# Patient Record
Sex: MALE | Race: WHITE
[De-identification: names, ages, dates, MRNs, and addresses within clinical notes are randomized per-mention and may not be internally consistent; named-entity substitution may affect disease eponyms.]

---

## 2023-08-22 ENCOUNTER — HOSPITAL ENCOUNTER (EMERGENCY)
Dept: HOSPITAL 75 - ER | Age: 33
Discharge: HOME | End: 2023-08-22
Payer: COMMERCIAL

## 2023-08-22 VITALS — WEIGHT: 240.3 LBS | HEIGHT: 74.8 IN | BODY MASS INDEX: 30.19 KG/M2

## 2023-08-22 VITALS — SYSTOLIC BLOOD PRESSURE: 152 MMHG | DIASTOLIC BLOOD PRESSURE: 80 MMHG

## 2023-08-22 DIAGNOSIS — M54.50: ICD-10-CM

## 2023-08-22 DIAGNOSIS — R07.2: Primary | ICD-10-CM

## 2023-08-22 LAB
ALBUMIN SERPL-MCNC: 4.8 GM/DL (ref 3.2–4.5)
ALP SERPL-CCNC: 50 U/L (ref 40–136)
ALT SERPL-CCNC: 32 U/L (ref 0–55)
APTT BLD: 29 SEC (ref 24–35)
APTT PPP: YELLOW S
BACTERIA #/AREA URNS HPF: NEGATIVE /HPF
BASOPHILS # BLD AUTO: 0.1 10^3/UL (ref 0–0.1)
BASOPHILS NFR BLD AUTO: 1 % (ref 0–10)
BILIRUB SERPL-MCNC: 0.3 MG/DL (ref 0.1–1)
BILIRUB UR QL STRIP: NEGATIVE
BUN/CREAT SERPL: 8
CALCIUM SERPL-MCNC: 9.2 MG/DL (ref 8.5–10.1)
CHLORIDE SERPL-SCNC: 109 MMOL/L (ref 98–107)
CO2 SERPL-SCNC: 25 MMOL/L (ref 21–32)
CREAT SERPL-MCNC: 0.93 MG/DL (ref 0.6–1.3)
EOSINOPHIL # BLD AUTO: 0.2 10^3/UL (ref 0–0.3)
EOSINOPHIL NFR BLD AUTO: 2 % (ref 0–10)
FIBRINOGEN PPP-MCNC: CLEAR MG/DL
GFR SERPLBLD BASED ON 1.73 SQ M-ARVRAT: 112 ML/MIN
GLUCOSE SERPL-MCNC: 95 MG/DL (ref 70–105)
GLUCOSE UR STRIP-MCNC: NEGATIVE MG/DL
HCT VFR BLD CALC: 40 % (ref 40–54)
HGB BLD-MCNC: 13.6 G/DL (ref 13.3–17.7)
INR PPP: 1 (ref 0.8–1.4)
KETONES UR QL STRIP: NEGATIVE
LEUKOCYTE ESTERASE UR QL STRIP: NEGATIVE
LYMPHOCYTES # BLD AUTO: 2.7 10^3/UL (ref 1–4)
LYMPHOCYTES NFR BLD AUTO: 31 % (ref 12–44)
MAGNESIUM SERPL-MCNC: 2 MG/DL (ref 1.6–2.4)
MANUAL DIFFERENTIAL PERFORMED BLD QL: NO
MCH RBC QN AUTO: 29 PG (ref 25–34)
MCHC RBC AUTO-ENTMCNC: 34 G/DL (ref 32–36)
MCV RBC AUTO: 84 FL (ref 80–99)
MONOCYTES # BLD AUTO: 0.8 10^3/UL (ref 0–1)
MONOCYTES NFR BLD AUTO: 9 % (ref 0–12)
NEUTROPHILS # BLD AUTO: 4.9 10^3/UL (ref 1.8–7.8)
NEUTROPHILS NFR BLD AUTO: 57 % (ref 42–75)
NITRITE UR QL STRIP: NEGATIVE
PH UR STRIP: 7 [PH] (ref 5–9)
PLATELET # BLD: 247 10^3/UL (ref 130–400)
PMV BLD AUTO: 11.4 FL (ref 9–12.2)
POTASSIUM SERPL-SCNC: 3.7 MMOL/L (ref 3.6–5)
PROT SERPL-MCNC: 6.9 GM/DL (ref 6.4–8.2)
PROT UR QL STRIP: NEGATIVE
PROTHROMBIN TIME: 12.9 SEC (ref 12.2–14.7)
RBC #/AREA URNS HPF: (no result) /HPF
SODIUM SERPL-SCNC: 144 MMOL/L (ref 135–145)
SP GR UR STRIP: <=1.005 (ref 1.02–1.02)
SQUAMOUS #/AREA URNS HPF: (no result) /HPF
WBC # BLD AUTO: 8.6 10^3/UL (ref 4.3–11)
WBC #/AREA URNS HPF: (no result) /HPF

## 2023-08-22 PROCEDURE — 36415 COLL VENOUS BLD VENIPUNCTURE: CPT

## 2023-08-22 PROCEDURE — 82550 ASSAY OF CK (CPK): CPT

## 2023-08-22 PROCEDURE — 84484 ASSAY OF TROPONIN QUANT: CPT

## 2023-08-22 PROCEDURE — 93005 ELECTROCARDIOGRAM TRACING: CPT

## 2023-08-22 PROCEDURE — 85730 THROMBOPLASTIN TIME PARTIAL: CPT

## 2023-08-22 PROCEDURE — 85025 COMPLETE CBC W/AUTO DIFF WBC: CPT

## 2023-08-22 PROCEDURE — 81000 URINALYSIS NONAUTO W/SCOPE: CPT

## 2023-08-22 PROCEDURE — 83735 ASSAY OF MAGNESIUM: CPT

## 2023-08-22 PROCEDURE — 80053 COMPREHEN METABOLIC PANEL: CPT

## 2023-08-22 PROCEDURE — 85610 PROTHROMBIN TIME: CPT

## 2023-08-22 PROCEDURE — 71045 X-RAY EXAM CHEST 1 VIEW: CPT

## 2023-08-22 PROCEDURE — 93041 RHYTHM ECG TRACING: CPT

## 2023-08-22 NOTE — DIAGNOSTIC IMAGING REPORT
CHEST 1 VIEW, AP/PA ONLY 



INDICATION: Chest pain.



COMPARISON: None.



FINDINGS:



Lungs: Normal lung volume. No focal consolidation. Stable

pulmonary vasculature. 



Pleura: No pleural effusion or pneumothorax.



Heart and Mediastinum: Cardiomediastinal silhouette and great

vessels of the thorax are stable.



Osseous Structures and Soft Tissues: No acute osseous

abnormality. Normal soft tissues.



IMPRESSION:

No acute cardiopulmonary process.



Dictated by: 



  Dictated on workstation # [id number]

## 2023-08-22 NOTE — ED CHEST PAIN
General


Chief Complaint:  Exposure


Stated Complaint:  CHEST PAIN


Nursing Triage Note:  


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 


MAINTENACE WORK, AND BACK PAIN


Source:  patient


Exam Limitations:  no limitations





History of Present Illness


Date Seen by Provider:  Aug 22, 2023


Time Seen by Provider:  18:30


Initial Comments


Patient is a 33-year-old male who presents to the emergency room with a chief 

complaint of substernal chest pain as well as low back pain.  Onset of the 

symptoms within the last 2 hours after getting home from work.  Patient was out 

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  He states he drinks 

quite a bit of water.  He states that he took some Tylenol at home for the pain 

without any relief of symptoms.  The patient gradually became more intense after

work to the point that he describes it as a "10 out of 10" pain.  He was a 

little nauseous and short of breath with the chest pain.  He has never had pain 

like this before.  He states he has urinated normally today.  Normal bowel 

movements.  He does have an extensive family history of early coronary artery 

disease.  The patient is a non-smoker, nondrinker.  He does not take medications

for blood pressure.  No illicit substances.  Nothing makes the chest pain any 

worse or better.  Nothing makes the back pain any worse or better.  He states 

the back pain is located low across the top of his hips.  It does not radiate 

into his groin or down his legs.  He denies leg weakness, numbness.  No loss of 

bowel or bladder function.  He did have a trip and near fall yesterday which 

resulted in an ankle sprain on the left.





Patient denies any recent prolonged immobility or extensive travel.  He has had 

no recent surgeries.  He has never had a blood clot before.


Timing/Duration:  1-3 hours


Severity/Quality:  severe


Location:  central (left central)


Radiation:  no radiation


Activities at Onset:  other (just after work)


Prior CP/Workup:  no prior chest pain, no prior cardiac workup


ASA po PTA:  No


NTG SL PTA:  No


Associated Symptoms:  back pain, nausea/vomiting, shortness of breath





Allergies and Home Medications


Allergies


Coded Allergies:  


     No Known Drug Allergies (Unverified , 23)





Patient Home Medication List


Home Medication List Reviewed:  Yes


Methocarbamol (Methocarbamol) 750 Mg Tablet, 750 MG PO Q8H


   Prescribed by: JERICA MUNIZ on 23





Review of Systems


Review of Systems


Constitutional:  see HPI


EENTM:  No Symptoms Reported


Respiratory:  Shortness of Air


Cardiovascular:  Chest Pain


Gastrointestinal:  Nausea


Genitourinary:  No Symptoms Reported


Musculoskeletal:  back pain


Skin:  no symptoms reported





Past Medical-Social-Family Hx


Patient Social History


Tobacco Use?:  No


Use of E-Cig and/or Vaping dev:  No


Substance use?:  No


Alcohol Use?:  No


Pt feels they are or have been:  No





Past Medical History


Surgery/Hospitalization HX:  


R WRIST SURGERY





Physical Exam


Vital Signs





Vital Signs - First Documented








 23





 18:30


 


Temp 37.1


 


Pulse 94


 


Resp 15


 


B/P (MAP) 165/111 (129)


 


Pulse Ox 98


 


O2 Delivery Room Air





Capillary Refill :


Height, Weight, BMI


Height: '"


Weight: lbs. oz. kg; 30.00 BMI


Method:


General Appearance:  WD/WN, Anxious


HEENT:  PERRL/EOMI


Neck:  Normal Inspection, Supple


Respiratory:  Lungs Clear, Normal Breath Sounds, No Accessory Muscle Use, No 

Respiratory Distress


Cardiovascular:  Regular Rate, Rhythm, Normal Peripheral Pulses (2+ rad bilat)


Gastrointestinal:  Soft


Extremity:  Normal Capillary Refill, Normal Inspection, Normal Range of Motion, 

Non Tender, No Calf Tenderness, No Pedal Edema


Neurologic/Psychiatric:  Alert, Oriented x3, No Motor/Sensory Deficits, CNs II-

XII Norm as Tested


Skin:  Normal Color, Warm/Dry





Progress/Results/Core Measures


Results/Orders


Lab Results





Laboratory Tests








Test


 23


18:32 23


19:05 23


20:51 Range/Units


 


 


White Blood Count


 8.6 


 


 


 4.3-11.0


10^3/uL


 


Red Blood Count


 4.73 


 


 


 4.30-5.52


10^6/uL


 


Hemoglobin 13.6    13.3-17.7  g/dL


 


Hematocrit 40    40-54  %


 


Mean Corpuscular Volume 84    80-99  fL


 


Mean Corpuscular Hemoglobin 29    25-34  pg


 


Mean Corpuscular Hemoglobin


Concent 34 


 


 


 32-36  g/dL





 


Red Cell Distribution Width 12.9    10.0-14.5  %


 


Platelet Count


 247 


 


 


 130-400


10^3/uL


 


Mean Platelet Volume 11.4    9.0-12.2  fL


 


Immature Granulocyte % (Auto) 0     %


 


Neutrophils (%) (Auto) 57    42-75  %


 


Lymphocytes (%) (Auto) 31    12-44  %


 


Monocytes (%) (Auto) 9    0-12  %


 


Eosinophils (%) (Auto) 2    0-10  %


 


Basophils (%) (Auto) 1    0-10  %


 


Neutrophils # (Auto)


 4.9 


 


 


 1.8-7.8


10^3/uL


 


Lymphocytes # (Auto)


 2.7 


 


 


 1.0-4.0


10^3/uL


 


Monocytes # (Auto)


 0.8 


 


 


 0.0-1.0


10^3/uL


 


Eosinophils # (Auto)


 0.2 


 


 


 0.0-0.3


10^3/uL


 


Basophils # (Auto)


 0.1 


 


 


 0.0-0.1


10^3/uL


 


Immature Granulocyte # (Auto)


 0.0 


 


 


 0.0-0.1


10^3/uL


 


Prothrombin Time 12.9    12.2-14.7  SEC


 


INR Comment 1.0    0.8-1.4  


 


Activated Partial


Thromboplast Time 29 


 


 


 24-35  SEC





 


Sodium Level 144    135-145  MMOL/L


 


Potassium Level 3.7    3.6-5.0  MMOL/L


 


Chloride Level 109 H     MMOL/L


 


Carbon Dioxide Level 25    21-32  MMOL/L


 


Anion Gap 10    5-14  MMOL/L


 


Blood Urea Nitrogen 7    7-18  MG/DL


 


Creatinine


 0.93 


 


 


 0.60-1.30


MG/DL


 


Estimat Glomerular Filtration


Rate 112 


 


 


  





 


BUN/Creatinine Ratio 8     


 


Glucose Level 95      MG/DL


 


Calcium Level 9.2    8.5-10.1  MG/DL


 


Corrected Calcium     8.5-10.1  MG/DL


 


Magnesium Level 2.0    1.6-2.4  MG/DL


 


Total Bilirubin 0.3    0.1-1.0  MG/DL


 


Aspartate Amino Transf


(AST/SGOT) 24 


 


 


 5-34  U/L





 


Alanine Aminotransferase


(ALT/SGPT) 32 


 


 


 0-55  U/L





 


Alkaline Phosphatase 50      U/L


 


Total Creatine Kinase 282 H     U/L


 


Troponin I < 0.028   < 0.028  <0.028  NG/ML


 


Total Protein 6.9    6.4-8.2  GM/DL


 


Albumin 4.8 H   3.2-4.5  GM/DL


 


Urine Color  YELLOW    


 


Urine Clarity  CLEAR    


 


Urine pH  7.0   5-9  


 


Urine Specific Gravity  <=1.005   1.016-1.022  


 


Urine Protein  NEGATIVE   NEGATIVE  


 


Urine Glucose (UA)  NEGATIVE   NEGATIVE  


 


Urine Ketones  NEGATIVE   NEGATIVE  


 


Urine Nitrite  NEGATIVE   NEGATIVE  


 


Urine Bilirubin  NEGATIVE   NEGATIVE  


 


Urine Urobilinogen  0.2   < = 1.0  MG/DL


 


Urine Leukocyte Esterase  NEGATIVE   NEGATIVE  


 


Urine RBC (Auto)  NEGATIVE   NEGATIVE  


 


Urine RBC  NONE    /HPF


 


Urine WBC  NONE    /HPF


 


Urine Squamous Epithelial


Cells 


 NONE 


 


  /HPF





 


Urine Crystals  NONE    /LPF


 


Urine Bacteria  NEGATIVE    /HPF


 


Urine Casts  NONE    /LPF


 


Urine Mucus  NEGATIVE    /LPF


 


Urine Culture Indicated  NO    








My Orders





Orders - JERICA MUNIZ MD


Ekg Tracing (23 18:29)


Cbc With Automated Diff (23 18:38)


Magnesium (23 18:38)


Chest 1 View, Ap/Pa Only (23 18:38)


Comprehensive Metabolic Panel (23 18:38)


Protime With Inr (23 18:38)


Partial Thromboplastin Time (23 18:38)


O2 (23 18:38)


Monitor-Rhythm Ecg Trace Only (23 18:38)


Ed Iv/Invasive Line Start (23 18:38)


Troponin I [place] (23 18:38)


Aspirin Chewable Tablet (Aspirin Chewabl (23 18:45)


Morphine  Injection (Morphine  Injection (23 18:38)


Ns Iv 1000 Ml (Sodium Chloride 0.9%) (23 18:38)


Ondansetron Injection (Zofran Injectio (23 18:45)


Creatine Kinase (23 19:11)


Ua Culture If Indicated (23 19:11)


Ekg Tracing (23 19:19)


Nitroglycerin 0.4 Mg Btl 25's (Nitroglyc (23 19:30)


Ketorolac Injection (Ketorolac Injection (23 19:45)


Orphenadrine Inj (Ed Only) (Norflex Inje (23 19:45)


Troponin I [place] (23 20:29)


Ketorolac Injection (Ketorolac Injection (23 21:00)





Medications Given in ED





Current Medications








 Medications  Dose


 Ordered  Sig/Carla


 Route  Start Time


 Stop Time Status Last Admin


Dose Admin


 


 Aspirin  324 mg  ONCE  ONCE


 PO  23 18:45


 23 18:46 DC 23 18:47


324 MG


 


 Ketorolac


 Tromethamine  15 mg  ONCE  ONCE


 IVP  23 19:45


 23 19:46 DC 23 19:41


15 MG


 


 Ketorolac


 Tromethamine  15 mg  ONCE  ONCE


 IVP  23 21:00


 23 21:01 DC 23 21:03


15 MG


 


 Nitroglycerin  1 TAB Q 5


 MIN X 3  AS NEEDED  PRN


 SL  23 19:30


 23 22:02 DC 23 19:26


0.4 MG


 


 Ondansetron HCl  4 mg  ONCE  ONCE


 IVP  23 18:45


 23 18:46 DC 23 18:47


4 MG


 


 Orphenadrine


 Citrate  60 mg  ONCE  ONCE


 IV  23 19:45


 23 19:46 DC 23 19:40


60 MG








Vital Signs/I&O











 23





 18:30 22:00


 


Temp 37.1 37.1


 


Pulse 94 85


 


Resp 15 15


 


B/P (MAP) 165/111 (129) 152/80


 


Pulse Ox 98 98


 


O2 Delivery Room Air Room Air














Blood Pressure Mean:                    129











Progress


Progress Note :  


   Time:  21:40


Progress Note


Patient seen and evaluated by me.  Evaluation today includes physical exam, 

"chest pain work-up" to include CBC, Chem-12, magnesium level, troponin x2, coag

panel, UA, single view chest x-ray, EKG x2.  Pertinent physical exam findings 

well-developed well-nourished male in mild distress due to pain.  He is quite 

hypertensive on arrival.  Heart is regular, lungs are clear.  Abdomen is soft.  

No reproducible chest wall tenderness.  Patient has some tenderness to palpation

in the low lumbar paraspinous musculature.  Negative straight leg raise 

bilaterally.  Normal distal pulses and sensation.  Normal strength in the lower 

extremities, no saddle anesthesia.  2+ radial pulses, 2+ dorsalis pedis pulses.





Differential diagnosis based on history and physical exam, dehydration, aortic 

dissection, acute coronary syndrome





Labs, EKG and chest x-ray independently reviewed and interpreted by me.  Total 

EKG is normal sinus rhythm without ectopy or ST segment change, repeat 1 hour 

later due to ongoing pain remains unchanged.  Chest x-ray shows no acute 

abnormality, no widened mediastinum, no effusion, no infiltrate.  CBC is 

reviewed and normal, Chem-12 is reviewed and normal.  Magnesium within normal 

limits.  Troponin x2 undetectable.  Urinalysis shows no evidence of hematuria or

infection.


Patient was initially given 324 mg of aspirin followed by morphine 4 mg and 

Zofran 4 mg.  When he did not achieve any significant relief I switched to 

sublingual nitro which made no improvement in his pain whatsoever.  Patient was 

then given 60 mg of IV Norflex and a total of 30 mg of IV Toradol.








Patient reevaluated after second troponin and second dose of Toradol feeling mu

ch better.  Able to sit up and lay down much more comfortably.  Suspect that the

majority of his symptoms are due to working out in the heat and some 

dehydration.  He has no concerning findings for acute coronary syndrome or 

aortic dissection.  No concerning complaints or supporting physical exam 

findings for pulmonary embolism.  No concerning findings for acute cauda equina 

syndrome.  Continues to deny numbness tingling weakness to the groin or legs.  

No radiating pain down the legs.  I reviewed discharge recommendation with the 

patient and his wife.  Over-the-counter naproxen 2 pills twice daily with food 

for the next 2 to 3 days.  Alternate heat and ice to the low back at the area of

his pain.  If any numbness tingling weakness or increasing pain recurs to please

return to the emergency room for reevaluation.


Initial ECG Impression Date:  Aug 22, 2023


Initial ECG Impression Time:  18:34


Initial ECG Rate:  98


Initial ECG Rhythm:  S.Tach


Initial ECG Intervals:  Normal


Initial ECG Impression:  Normal


EKG :  


   EKG Time:  19:22


   Rate:  75


   Rhythm:  Normal Sinus


   Intervals:  Normal


   ECG Comparisson:  Unchanged


   ECG Impression:  Normal





Diagnostic Imaging





   Diagonstic Imaging:  Xray


   Plain Films/CT/US/NM/MRI:  chest


Comments


                 [facility] [place], [place]





NAME:   ASHLEY CROCKETT


[facility] REC#:   [id number]


ACCOUNT#:   [id number]


PT STATUS:   REG ER


:   1990


PHYSICIAN:   JERICA MUNIZ MD


ADMIT DATE:   23/ER


                                  ***Signed***


Date of Exam:23





CHEST 1 VIEW, AP/PA ONLY








CHEST 1 VIEW, AP/PA ONLY 





INDICATION: Chest pain.





COMPARISON: None.





FINDINGS:





Lungs: Normal lung volume. No focal consolidation. Stable


pulmonary vasculature. 





Pleura: No pleural effusion or pneumothorax.





Heart and Mediastinum: Cardiomediastinal silhouette and great


vessels of the thorax are stable.





Osseous Structures and Soft Tissues: No acute osseous


abnormality. Normal soft tissues.





IMPRESSION:


No acute cardiopulmonary process.





Dictated by: 





  Dictated on workstation # [id number]








Dict:   23


Trans:   23


[facility] [id number]





Interpreted by:     JETT FARNSWORTH DO


Electronically signed by: JETT FARNSWORTH DO 23





Departure


Impression





   Primary Impression:  


   Chest pain


   Qualified Codes:  R07.9 - Chest pain, unspecified


   Additional Impression:  


   Low back pain


   Qualified Codes:  M54.50 - Low back pain, unspecified


Disposition:  01 HOME, SELF-CARE


Condition:  Stable





Departure-Patient Inst.


Decision time for Depature:  21:42


Referrals:  


NO,LOCAL PHYSICIAN (PCP)


Primary Care Physician


Patient Instructions:  Low Back Pain ED





Add. Discharge Instructions:  


Take over-the-counter naproxen (Aleve), 2 tablets twice daily with food as 

needed for pain.





Methocarbamol 750mg tablets, 1-2 every 8 hours as needed for muscle 

spasm/discomfort.





Alternate heat and ice packs to the low back at the area of pain over the course

of the next 24 hours.  Do get up and move around frequently to stay "limber".





You can also apply over-the-counter lidocaine patches for pain, available at the

pharmacy -please follow packaging directions for use.





If you develop any numbness, tingling or weakness to the legs or any other 

acute, emergent symptoms please return to the emergency department for 

reevaluation.





Follow up with your primary care physician.


Scripts


Methocarbamol (Methocarbamol) 750 Mg Tablet


750 MG PO Q8H for Back Pain, #20 TAB


   Prov: JERICA MUNIZ MD         23











JERICA MUNIZ MD         Aug 22, 2023 18:42